# Patient Record
Sex: FEMALE | Race: WHITE | NOT HISPANIC OR LATINO | Employment: OTHER | URBAN - METROPOLITAN AREA
[De-identification: names, ages, dates, MRNs, and addresses within clinical notes are randomized per-mention and may not be internally consistent; named-entity substitution may affect disease eponyms.]

---

## 2018-05-08 ENCOUNTER — APPOINTMENT (OUTPATIENT)
Dept: RADIOLOGY | Facility: MEDICAL CENTER | Age: 78
End: 2018-05-08
Attending: EMERGENCY MEDICINE
Payer: MEDICARE

## 2018-05-08 ENCOUNTER — HOSPITAL ENCOUNTER (OUTPATIENT)
Facility: MEDICAL CENTER | Age: 78
End: 2018-05-10
Attending: EMERGENCY MEDICINE | Admitting: HOSPITALIST
Payer: MEDICARE

## 2018-05-08 DIAGNOSIS — R79.89 POSITIVE D DIMER: ICD-10-CM

## 2018-05-08 DIAGNOSIS — M79.89 LEG SWELLING: ICD-10-CM

## 2018-05-08 DIAGNOSIS — R06.02 SHORTNESS OF BREATH: ICD-10-CM

## 2018-05-08 PROCEDURE — 84484 ASSAY OF TROPONIN QUANT: CPT

## 2018-05-08 PROCEDURE — 85379 FIBRIN DEGRADATION QUANT: CPT

## 2018-05-08 PROCEDURE — 99285 EMERGENCY DEPT VISIT HI MDM: CPT

## 2018-05-08 PROCEDURE — 83880 ASSAY OF NATRIURETIC PEPTIDE: CPT

## 2018-05-08 PROCEDURE — 85025 COMPLETE CBC W/AUTO DIFF WBC: CPT

## 2018-05-08 PROCEDURE — 80053 COMPREHEN METABOLIC PANEL: CPT

## 2018-05-08 PROCEDURE — 36415 COLL VENOUS BLD VENIPUNCTURE: CPT

## 2018-05-08 RX ORDER — LISINOPRIL 5 MG/1
2.5 TABLET ORAL 2 TIMES DAILY
COMMUNITY

## 2018-05-08 RX ORDER — ASPIRIN 81 MG/1
81 TABLET, CHEWABLE ORAL DAILY
COMMUNITY

## 2018-05-08 RX ORDER — FUROSEMIDE 20 MG/1
20 TABLET ORAL DAILY
COMMUNITY

## 2018-05-08 ASSESSMENT — PAIN SCALES - GENERAL
PAINLEVEL_OUTOF10: 5
PAINLEVEL_OUTOF10: 8

## 2018-05-09 ENCOUNTER — APPOINTMENT (OUTPATIENT)
Dept: RADIOLOGY | Facility: MEDICAL CENTER | Age: 78
End: 2018-05-09
Attending: HOSPITALIST
Payer: MEDICARE

## 2018-05-09 ENCOUNTER — APPOINTMENT (OUTPATIENT)
Dept: RADIOLOGY | Facility: MEDICAL CENTER | Age: 78
End: 2018-05-09
Attending: EMERGENCY MEDICINE
Payer: MEDICARE

## 2018-05-09 PROBLEM — I50.9 CHF (CONGESTIVE HEART FAILURE) (HCC): Status: ACTIVE | Noted: 2018-05-09

## 2018-05-09 PROBLEM — R79.89 POSITIVE D DIMER: Status: ACTIVE | Noted: 2018-05-09

## 2018-05-09 PROBLEM — I10 HTN (HYPERTENSION): Status: ACTIVE | Noted: 2018-05-09

## 2018-05-09 LAB
ALBUMIN SERPL BCP-MCNC: 3.5 G/DL (ref 3.2–4.9)
ALBUMIN/GLOB SERPL: 1.2 G/DL
ALP SERPL-CCNC: 79 U/L (ref 30–99)
ALT SERPL-CCNC: 18 U/L (ref 2–50)
ANION GAP SERPL CALC-SCNC: 3 MMOL/L (ref 0–11.9)
APPEARANCE UR: ABNORMAL
AST SERPL-CCNC: 22 U/L (ref 12–45)
BACTERIA #/AREA URNS HPF: ABNORMAL /HPF
BASOPHILS # BLD AUTO: 0.5 % (ref 0–1.8)
BASOPHILS # BLD: 0.06 K/UL (ref 0–0.12)
BILIRUB SERPL-MCNC: 0.8 MG/DL (ref 0.1–1.5)
BILIRUB UR QL STRIP.AUTO: NEGATIVE
BNP SERPL-MCNC: 29 PG/ML (ref 0–100)
BUN SERPL-MCNC: 17 MG/DL (ref 8–22)
CALCIUM SERPL-MCNC: 8.3 MG/DL (ref 8.4–10.2)
CHLORIDE SERPL-SCNC: 108 MMOL/L (ref 96–112)
CO2 SERPL-SCNC: 25 MMOL/L (ref 20–33)
COLOR UR: YELLOW
CREAT SERPL-MCNC: 0.97 MG/DL (ref 0.5–1.4)
DEPRECATED D DIMER PPP IA-ACNC: 301 NG/ML(D-DU)
EOSINOPHIL # BLD AUTO: 0.59 K/UL (ref 0–0.51)
EOSINOPHIL NFR BLD: 5 % (ref 0–6.9)
EPI CELLS #/AREA URNS HPF: ABNORMAL /HPF
ERYTHROCYTE [DISTWIDTH] IN BLOOD BY AUTOMATED COUNT: 45.7 FL (ref 35.9–50)
GLOBULIN SER CALC-MCNC: 3 G/DL (ref 1.9–3.5)
GLUCOSE SERPL-MCNC: 99 MG/DL (ref 65–99)
GLUCOSE UR STRIP.AUTO-MCNC: NEGATIVE MG/DL
HCT VFR BLD AUTO: 43.2 % (ref 37–47)
HGB BLD-MCNC: 14.2 G/DL (ref 12–16)
IMM GRANULOCYTES # BLD AUTO: 0.04 K/UL (ref 0–0.11)
IMM GRANULOCYTES NFR BLD AUTO: 0.3 % (ref 0–0.9)
KETONES UR STRIP.AUTO-MCNC: ABNORMAL MG/DL
LEUKOCYTE ESTERASE UR QL STRIP.AUTO: ABNORMAL
LV EJECT FRACT  99904: 60
LV EJECT FRACT MOD 2C 99903: 68.58
LV EJECT FRACT MOD 4C 99902: 54.13
LV EJECT FRACT MOD BP 99901: 61.99
LYMPHOCYTES # BLD AUTO: 2.22 K/UL (ref 1–4.8)
LYMPHOCYTES NFR BLD: 19 % (ref 22–41)
MCH RBC QN AUTO: 32.2 PG (ref 27–33)
MCHC RBC AUTO-ENTMCNC: 32.9 G/DL (ref 33.6–35)
MCV RBC AUTO: 98 FL (ref 81.4–97.8)
MICRO URNS: ABNORMAL
MONOCYTES # BLD AUTO: 0.9 K/UL (ref 0–0.85)
MONOCYTES NFR BLD AUTO: 7.7 % (ref 0–13.4)
MUCOUS THREADS #/AREA URNS HPF: ABNORMAL /HPF
NEUTROPHILS # BLD AUTO: 7.88 K/UL (ref 2–7.15)
NEUTROPHILS NFR BLD: 67.5 % (ref 44–72)
NITRITE UR QL STRIP.AUTO: NEGATIVE
NRBC # BLD AUTO: 0 K/UL
NRBC BLD-RTO: 0 /100 WBC
PH UR STRIP.AUTO: 5 [PH]
PLATELET # BLD AUTO: 170 K/UL (ref 164–446)
PMV BLD AUTO: 12.3 FL (ref 9–12.9)
POTASSIUM SERPL-SCNC: 3.7 MMOL/L (ref 3.6–5.5)
PROT SERPL-MCNC: 6.5 G/DL (ref 6–8.2)
PROT UR QL STRIP: NEGATIVE MG/DL
RBC # BLD AUTO: 4.41 M/UL (ref 4.2–5.4)
RBC # URNS HPF: ABNORMAL /HPF
RBC UR QL AUTO: ABNORMAL
SODIUM SERPL-SCNC: 136 MMOL/L (ref 135–145)
SP GR UR REFRACTOMETRY: 1.03
TROPONIN I SERPL-MCNC: <0.02 NG/ML (ref 0–0.04)
TROPONIN I SERPL-MCNC: <0.02 NG/ML (ref 0–0.04)
WBC # BLD AUTO: 11.7 K/UL (ref 4.8–10.8)
WBC #/AREA URNS HPF: ABNORMAL /HPF

## 2018-05-09 PROCEDURE — 700102 HCHG RX REV CODE 250 W/ 637 OVERRIDE(OP): Performed by: HOSPITALIST

## 2018-05-09 PROCEDURE — 93306 TTE W/DOPPLER COMPLETE: CPT

## 2018-05-09 PROCEDURE — A9270 NON-COVERED ITEM OR SERVICE: HCPCS | Performed by: HOSPITALIST

## 2018-05-09 PROCEDURE — 93970 EXTREMITY STUDY: CPT

## 2018-05-09 PROCEDURE — G0378 HOSPITAL OBSERVATION PER HR: HCPCS

## 2018-05-09 PROCEDURE — 99219 PR INITIAL OBSERVATION CARE,LEVL II: CPT | Performed by: HOSPITALIST

## 2018-05-09 PROCEDURE — 81001 URINALYSIS AUTO W/SCOPE: CPT

## 2018-05-09 PROCEDURE — 71046 X-RAY EXAM CHEST 2 VIEWS: CPT

## 2018-05-09 PROCEDURE — 84484 ASSAY OF TROPONIN QUANT: CPT

## 2018-05-09 PROCEDURE — 93005 ELECTROCARDIOGRAM TRACING: CPT | Performed by: EMERGENCY MEDICINE

## 2018-05-09 PROCEDURE — 93306 TTE W/DOPPLER COMPLETE: CPT | Mod: 26 | Performed by: INTERNAL MEDICINE

## 2018-05-09 RX ORDER — POLYETHYLENE GLYCOL 3350 17 G/17G
1 POWDER, FOR SOLUTION ORAL
Status: DISCONTINUED | OUTPATIENT
Start: 2018-05-09 | End: 2018-05-10 | Stop reason: HOSPADM

## 2018-05-09 RX ORDER — ASPIRIN 81 MG/1
81 TABLET, CHEWABLE ORAL DAILY
Status: DISCONTINUED | OUTPATIENT
Start: 2018-05-09 | End: 2018-05-10 | Stop reason: HOSPADM

## 2018-05-09 RX ORDER — LISINOPRIL 2.5 MG/1
2.5 TABLET ORAL 2 TIMES DAILY
Status: DISCONTINUED | OUTPATIENT
Start: 2018-05-09 | End: 2018-05-10 | Stop reason: HOSPADM

## 2018-05-09 RX ORDER — AMOXICILLIN 250 MG
2 CAPSULE ORAL 2 TIMES DAILY
Status: DISCONTINUED | OUTPATIENT
Start: 2018-05-09 | End: 2018-05-10 | Stop reason: HOSPADM

## 2018-05-09 RX ORDER — ACETAMINOPHEN 325 MG/1
650 TABLET ORAL EVERY 6 HOURS PRN
Status: DISCONTINUED | OUTPATIENT
Start: 2018-05-09 | End: 2018-05-10 | Stop reason: HOSPADM

## 2018-05-09 RX ORDER — FUROSEMIDE 20 MG/1
20 TABLET ORAL 2 TIMES DAILY
Status: DISCONTINUED | OUTPATIENT
Start: 2018-05-09 | End: 2018-05-10 | Stop reason: HOSPADM

## 2018-05-09 RX ORDER — BISACODYL 10 MG
10 SUPPOSITORY, RECTAL RECTAL
Status: DISCONTINUED | OUTPATIENT
Start: 2018-05-09 | End: 2018-05-10 | Stop reason: HOSPADM

## 2018-05-09 RX ADMIN — ASPIRIN 81 MG 81 MG: 81 TABLET ORAL at 09:12

## 2018-05-09 RX ADMIN — LISINOPRIL 2.5 MG: 2.5 TABLET ORAL at 20:39

## 2018-05-09 RX ADMIN — FUROSEMIDE 20 MG: 20 TABLET ORAL at 06:28

## 2018-05-09 RX ADMIN — LISINOPRIL 2.5 MG: 2.5 TABLET ORAL at 09:12

## 2018-05-09 ASSESSMENT — PATIENT HEALTH QUESTIONNAIRE - PHQ9
SUM OF ALL RESPONSES TO PHQ9 QUESTIONS 1 AND 2: 0
SUM OF ALL RESPONSES TO PHQ9 QUESTIONS 1 AND 2: 0
1. LITTLE INTEREST OR PLEASURE IN DOING THINGS: NOT AT ALL
2. FEELING DOWN, DEPRESSED, IRRITABLE, OR HOPELESS: NOT AT ALL
1. LITTLE INTEREST OR PLEASURE IN DOING THINGS: NOT AT ALL
2. FEELING DOWN, DEPRESSED, IRRITABLE, OR HOPELESS: NOT AT ALL

## 2018-05-09 ASSESSMENT — ENCOUNTER SYMPTOMS
DEPRESSION: 0
COUGH: 0
HEADACHES: 0
PALPITATIONS: 0
ABDOMINAL PAIN: 0
FOCAL WEAKNESS: 0
NAUSEA: 0
DIZZINESS: 0
VOMITING: 0
PHOTOPHOBIA: 0
CHILLS: 0
WHEEZING: 0
SORE THROAT: 0
FEVER: 0
SHORTNESS OF BREATH: 1
TINGLING: 0
MYALGIAS: 0
DIARRHEA: 0

## 2018-05-09 ASSESSMENT — COPD QUESTIONNAIRES
DO YOU EVER COUGH UP ANY MUCUS OR PHLEGM?: NO/ONLY WITH OCCASIONAL COLDS OR INFECTIONS
DO YOU EVER COUGH UP ANY MUCUS OR PHLEGM?: NO/ONLY WITH OCCASIONAL COLDS OR INFECTIONS
COPD SCREENING SCORE: 4
IN THE PAST 12 MONTHS DO YOU DO LESS THAN YOU USED TO BECAUSE OF YOUR BREATHING PROBLEMS: DISAGREE/UNSURE
DURING THE PAST 4 WEEKS HOW MUCH DID YOU FEEL SHORT OF BREATH: NONE/LITTLE OF THE TIME
COPD SCREENING SCORE: 4
DURING THE PAST 4 WEEKS HOW MUCH DID YOU FEEL SHORT OF BREATH: NONE/LITTLE OF THE TIME
HAVE YOU SMOKED AT LEAST 100 CIGARETTES IN YOUR ENTIRE LIFE: YES
HAVE YOU SMOKED AT LEAST 100 CIGARETTES IN YOUR ENTIRE LIFE: YES

## 2018-05-09 ASSESSMENT — PAIN SCALES - GENERAL
PAINLEVEL_OUTOF10: 0

## 2018-05-09 ASSESSMENT — LIFESTYLE VARIABLES
EVER_SMOKED: YES
ALCOHOL_USE: NO
EVER_SMOKED: YES

## 2018-05-09 NOTE — ASSESSMENT & PLAN NOTE
This by the patient's report only, he not have any records to confirm a diagnosis of congestive heart failure. Her current BNP is normal. She has no clinical evidence of volume overload. I will continue her home Lasix and ACE inhibitor. If blood pressure tolerates, we may consider the addition of a beta blocker and also a baby aspirin.

## 2018-05-09 NOTE — ED NOTES
Report to Tyrel BENNETT.  Patient appears asleep in bed. Regular unlabored respirations visualized.

## 2018-05-09 NOTE — ED NOTES
Pt in bed, c/o maxilla and nose pain and right side rib pain from fall a month ago. Pt also c/o shortness of breath and bilateral leg swelling greater than normal.

## 2018-05-09 NOTE — PROGRESS NOTES
Call received from [employer]; IV infiltrated. RN to bedside to see pt and discuss IV placement. Pt refuses IV placement and understands importance of test and test results of possible blood clot in lungs cannot be obtained without completion of this test. Dr. Osman aware.   Pt assisted to ambulate hallway; room air saturation with ambulation is 97%; HR 80s-90s. Pt assisted back to bed safely to sit up and eat lunch. Will continue with care.

## 2018-05-09 NOTE — PROGRESS NOTES
Patient admitted after midnight by Dr. Farrell. This is a 77-year-old female who presents for evaluation of shortness of breath and leg swelling. Patient has a history of IV contrast allergy. VQ scan was ordered but patient refused as she did not a peripheral IV placed. Patient understood the risks of not having further evaluation of her shortness of breath and she understands the risks. She is alert awake oriented ×4. Ultrasound lower extremity negative for DVT. Echocardiogram ordered for further evaluation.

## 2018-05-09 NOTE — ASSESSMENT & PLAN NOTE
This is chronic, blood pressure currently well controlled on home lisinopril, continue and monitor.

## 2018-05-09 NOTE — ED NOTES
Pt transported to room 322 on [facility] by RN and with all pt belongings.

## 2018-05-09 NOTE — H&P
Hospital Medicine History and Physical    Date of Service  5/9/2018    Chief Complaint  Shortness of breath and leg swelling    History of Presenting Illness  77 y.o. female who presented on 5/8/2018 with shortness of breath and leg swelling which have been ongoing for the last day. The patient left [place] on March 29 for cross-country drive to [facility] for a family gathering. She has been traveling alone. She just arrived in [place] today and states that she began to have shortness of breath yesterday. Today, her breathing problems gradually worsened was also associated with reported lower extremity swelling. She states that while driving, she will sit in the car for 6 hours straight before stopping. She reports a history of congestive heart failure and has been taking her Lasix as prescribed. She is not on supplemental O2 at baseline. Upon assessment in the emergency room, patient is not hypoxic but is noted to have an elevated d-dimer level.      Primary Care Physician  Pcp Pt States None    Consultants  None    Code Status  Full    Review of Systems  Review of Systems   Constitutional: Positive for malaise/fatigue. Negative for chills and fever.   HENT: Negative for congestion and sore throat.    Eyes: Negative for photophobia.   Respiratory: Positive for shortness of breath. Negative for cough and wheezing.    Cardiovascular: Negative for chest pain and palpitations.   Gastrointestinal: Negative for abdominal pain, diarrhea, nausea and vomiting.   Genitourinary: Negative for dysuria.   Musculoskeletal: Negative for myalgias.        Leg pain   Skin: Negative.    Neurological: Negative for dizziness, tingling, focal weakness and headaches.   Psychiatric/Behavioral: Negative for depression and suicidal ideas.        Past Medical History  Past Medical History:   Diagnosis Date   • Congestive heart failure (HCC)    • Hypertension        Surgical History  Past Surgical History:   Procedure Laterality Date   •  PACEMAKER INSERTION         Medications  No current facility-administered medications on file prior to encounter.      No current outpatient prescriptions on file prior to encounter.       Family History  History reviewed. No pertinent family history.    Social History  Social History   Substance Use Topics   • Smoking status: Never Smoker   • Smokeless tobacco: Never Used   • Alcohol use No       Allergies  Allergies   Allergen Reactions   • Cillins [Penicillins]    • Codeine    • Iodine    • Sulfa Drugs         Physical Exam  Laboratory   Hemodynamics  Temp (24hrs), Av.9 °C (98.5 °F), Min:36.9 °C (98.5 °F), Max:36.9 °C (98.5 °F)   Temperature: 36.9 °C (98.5 °F)  Pulse  Av.1  Min: 60  Max: 86 Heart Rate (Monitored): 62  Blood Pressure : 123/74, NIBP: 122/66      Respiratory      Respiration: (!) 23, Pulse Oximetry: 100 %             Physical Exam   Constitutional: She is oriented to person, place, and time. No distress.   Obese   HENT:   Head: Normocephalic and atraumatic.   Right Ear: External ear normal.   Left Ear: External ear normal.   Eyes: EOM are normal. Right eye exhibits no discharge. Left eye exhibits no discharge.   Neck: Neck supple. No JVD present.   Cardiovascular: Normal rate, regular rhythm and normal heart sounds.    Pulmonary/Chest: Effort normal and breath sounds normal. No respiratory distress. She exhibits no tenderness.   Breath sounds distant secondary to habitus   Abdominal: Soft. Bowel sounds are normal. She exhibits no distension. There is no tenderness.   Musculoskeletal: Normal range of motion. She exhibits no edema (no pitting).   Neurological: She is alert and oriented to person, place, and time. No cranial nerve deficit.   Skin: Skin is warm and dry. She is not diaphoretic. No erythema.   Psychiatric: She has a normal mood and affect. Her behavior is normal.   Nursing note and vitals reviewed.    Capillary refill less than 3 seconds, distal pulses intact    Recent Labs       05/08/18   2345   WBC  11.7*   RBC  4.41   HEMOGLOBIN  14.2   HEMATOCRIT  43.2   MCV  98.0*   MCH  32.2   MCHC  32.9*   RDW  45.7   PLATELETCT  170   MPV  12.3     Recent Labs      05/08/18   2345   SODIUM  136   POTASSIUM  3.7   CHLORIDE  108   CO2  25   GLUCOSE  99   BUN  17   CREATININE  0.97   CALCIUM  8.3*     Recent Labs      05/08/18   2345   ALTSGPT  18   ASTSGOT  22   ALKPHOSPHAT  79   TBILIRUBIN  0.8   GLUCOSE  99         Recent Labs      05/08/18   2345   BNPBTYPENAT  29         Lab Results   Component Value Date    TROPONINI <0.02 05/08/2018       Imaging  Dx-chest-2 Views    Result Date: 5/9/2018 5/9/2018 12:59 AM HISTORY/REASON FOR EXAM:  Shortness of Breath TECHNIQUE/EXAM DESCRIPTION: PA and lateral views of the chest. COMPARISON:  None. FINDINGS: There is a left sided pacemaker in good position with its distal leads overlying the right atrium and right ventricle. There is a moderate-sized air-fluid level in the left retrocardiac region The lungs are clear. The cardiac silhouette is normal in size. No effusions or pneumothoraces are present. There are no significant osseous abnormalities. The visualized portions of the upper abdomen are within normal limits.     1.  There is a left sided pacemaker in good position with its distal leads overlying the right atrium and right ventricle. 2.  Moderate sized retrocardiac hiatal hernia.    Us-extremity Venous Bilateral Lower    Result Date: 5/9/2018 5/9/2018 12:20 AM HISTORY/REASON FOR EXAM:  Pain in Limb with Pressure TECHNIQUE/EXAM DESCRIPTION: Using both color and pulsed-wave Doppler imaging, multiple images were obtained of the bilateral lower extremities from the common femoral vein origins distally through the popliteal trifurcations.  Graded compression was used to demonstrate patent lumens. COMPARISON:  None. FINDINGS: There is no evidence of luminal thrombus.  There is normal augmentation to flow and respiratory variation. 3.2 cm elliptical fluid  signal intensity collection is noted posterior medial to the right knee. 2.8 cm elliptical collection is noted adjacent to the posterior medial aspect of the left knee.     1.  No evidence of bilateral lower extremity deep venous thrombosis. 2.  Small bilateral Baker cysts are identified.     Assessment/Plan     Anticipate that patient will need less than 2 midnights for management of the discussed medical issues.    * Positive D dimer   Assessment & Plan    Patient certainly has risk factors for pulmonary emboli in light of her long confinement in the car. Ultrasound of the lower extremities fortunately shows no DVT which is encouraging. She is short of breath but is not hypoxic. Patient reports a history of IV contrast allergies therefore she will be admitted for a VQ scan. Respiratory therapy protocol if needed.        CHF (congestive heart failure) (HCC)   Assessment & Plan    This by the patient's report only, I do not have any records to confirm a diagnosis of congestive heart failure. Her current BNP is normal. She has no clinical evidence of volume overload. I will continue her home Lasix and ACE inhibitor. If blood pressure tolerates, we may consider the addition of a beta blocker and also a baby aspirin.        HTN (hypertension)   Assessment & Plan    This is chronic, blood pressure currently well controlled on home lisinopril, continue and monitor.          Prophylaxis: Sequential compression devices for DVT prophylaxis, no PPI indicated, bowel protocol

## 2018-05-09 NOTE — PROGRESS NOTES
Report received from SABRINA Sarah. Pt resting comfortably in bed, sleeping, in no apparent distress. Will continue with care.

## 2018-05-09 NOTE — PROGRESS NOTES
Report received from Mira about 1530 and pt transferred in a wheelchair about 1545.  Echo at bedside about 1610.  Skin assessment done after she finished and pt has 3 spot of dermatitis on her left buttock.  VSS.  Pt steady on her feet.  Trace about of swelling in her ankles.  She says that the sob is gone.

## 2018-05-09 NOTE — ASSESSMENT & PLAN NOTE
He should certainly has risk factors for pulmonary emboli in light of her long confinement in the car. Ultrasound of the lower extremities fortunately shows no DVT which is encouraging. She is short of breath but is not hypoxic. Patient reports a history of IV contrast allergies therefore she will be admitted for a VQ scan.

## 2018-05-09 NOTE — ED NOTES
Pt states she is allergic to iodine contrast, she passed out and vomited after she had CT with contrast years ago. ERP notified. IV placed by other RN using ultrasound. Blood samples to lab.

## 2018-05-10 ENCOUNTER — PATIENT OUTREACH (OUTPATIENT)
Dept: HEALTH INFORMATION MANAGEMENT | Facility: OTHER | Age: 78
End: 2018-05-10

## 2018-05-10 VITALS
SYSTOLIC BLOOD PRESSURE: 121 MMHG | HEART RATE: 63 BPM | DIASTOLIC BLOOD PRESSURE: 49 MMHG | RESPIRATION RATE: 18 BRPM | HEIGHT: 62 IN | TEMPERATURE: 98.1 F | BODY MASS INDEX: 36.51 KG/M2 | WEIGHT: 198.41 LBS | OXYGEN SATURATION: 95 %

## 2018-05-10 PROBLEM — R79.89 POSITIVE D DIMER: Status: RESOLVED | Noted: 2018-05-09 | Resolved: 2018-05-10

## 2018-05-10 LAB
ANION GAP SERPL CALC-SCNC: 3 MMOL/L (ref 0–11.9)
BUN SERPL-MCNC: 17 MG/DL (ref 8–22)
CALCIUM SERPL-MCNC: 8.8 MG/DL (ref 8.4–10.2)
CHLORIDE SERPL-SCNC: 108 MMOL/L (ref 96–112)
CO2 SERPL-SCNC: 27 MMOL/L (ref 20–33)
CREAT SERPL-MCNC: 0.81 MG/DL (ref 0.5–1.4)
ERYTHROCYTE [DISTWIDTH] IN BLOOD BY AUTOMATED COUNT: 46.1 FL (ref 35.9–50)
GLUCOSE SERPL-MCNC: 91 MG/DL (ref 65–99)
HCT VFR BLD AUTO: 42.2 % (ref 37–47)
HGB BLD-MCNC: 13.8 G/DL (ref 12–16)
MCH RBC QN AUTO: 31.9 PG (ref 27–33)
MCHC RBC AUTO-ENTMCNC: 32.7 G/DL (ref 33.6–35)
MCV RBC AUTO: 97.5 FL (ref 81.4–97.8)
PLATELET # BLD AUTO: 157 K/UL (ref 164–446)
PMV BLD AUTO: 12.9 FL (ref 9–12.9)
POTASSIUM SERPL-SCNC: 3.8 MMOL/L (ref 3.6–5.5)
RBC # BLD AUTO: 4.33 M/UL (ref 4.2–5.4)
SODIUM SERPL-SCNC: 138 MMOL/L (ref 135–145)
WBC # BLD AUTO: 7.5 K/UL (ref 4.8–10.8)

## 2018-05-10 PROCEDURE — 85027 COMPLETE CBC AUTOMATED: CPT

## 2018-05-10 PROCEDURE — G0378 HOSPITAL OBSERVATION PER HR: HCPCS

## 2018-05-10 PROCEDURE — A9270 NON-COVERED ITEM OR SERVICE: HCPCS | Performed by: HOSPITALIST

## 2018-05-10 PROCEDURE — 99217 PR OBSERVATION CARE DISCHARGE: CPT | Performed by: INTERNAL MEDICINE

## 2018-05-10 PROCEDURE — 700102 HCHG RX REV CODE 250 W/ 637 OVERRIDE(OP): Performed by: HOSPITALIST

## 2018-05-10 PROCEDURE — 80048 BASIC METABOLIC PNL TOTAL CA: CPT

## 2018-05-10 RX ADMIN — ASPIRIN 81 MG 81 MG: 81 TABLET ORAL at 08:19

## 2018-05-10 ASSESSMENT — PAIN SCALES - GENERAL: PAINLEVEL_OUTOF10: 0

## 2018-05-10 NOTE — PROGRESS NOTES
"0600 Pt up to bathroom, back to bed. Pt /65. Pt refusing Lasix. Stating her \"BP is bottoming out\" and that she \"wants to talk to the dr.\" This RN explained the importance of taking her Lasix, pt still refusing. Pt resting comfortably in bed, no other needs at this time.  "

## 2018-05-10 NOTE — PROGRESS NOTES
"Dr. Osman rounded about 1040 and wrote orders for discharge.  When going to ask about 1045 what time she wanted to leave, she states \"I'm not leaving until I get my nose looked at and check for a broken bone in back because the paramedics said I broke my back on April 20th when I fell in the bathroom in the [facility] in [place].\" Dr. Osman back to the room and pulled up her cxr and showed her the images and spoke to her that she had been seen in [place] for the fall.  Pt requested that she be able to eat her lunch and then will take her home medication of lisinopril as she has one in her purse.  After eating lunch, pt wanted to wait an hour before leaving as she will be driving.  Pt showered and changed about 1330.  Discharging Patient home per physician order.  Discharged with herself at 1445.  Demonstrated understanding of discharge instructions, follow up appointments, home medications, prescriptions, and nursing care instructions for heart failure.  Ambulating without assistance, voiding without difficulty, pain well controlled, tolerating oral medications, oxygen saturation greater than 90% , tolerating diet.   Educational handouts given and discussed.  Verbalized understanding of discharge instructions and educational handouts.  All questions answered.  Belongings with patient at time of discharge.  "

## 2018-05-10 NOTE — PROGRESS NOTES
0655 Bedside report given to day shift RNChirag. POC discussed. Pt resting comfortably in bed. Call light and personal belongings within reach. Hourly rounding and safety precautions in place.

## 2018-05-10 NOTE — DISCHARGE INSTRUCTIONS
Discharge Instructions    Discharged to home by car with self. Discharged via wheelchair, hospital escort: Yes.  Special equipment needed: Not Applicable    Be sure to schedule a follow-up appointment with your primary care doctor or any specialists as instructed.     Discharge Plan:   Diet Plan: Discussed  Activity Level: Discussed  Confirmed Follow up Appointment: Patient to Call and Schedule Appointment  Confirmed Symptoms Management: Discussed  Medication Reconciliation Updated: Yes  Pneumococcal Vaccine Administered/Refused: Not given - Patient refused pneumococcal vaccine  Influenza Vaccine Indication: Patient Refuses    I understand that a diet low in cholesterol, fat, and sodium is recommended for good health. Unless I have been given specific instructions below for another diet, I accept this instruction as my diet prescription.       Special Instructions:     Heart Failure  Heart failure means your heart has trouble pumping blood. This makes it hard for your body to work well. Heart failure is usually a long-term (chronic) condition. You must take good care of yourself and follow your doctor's treatment plan.  HOME CARE  · Take your heart medicine as told by your doctor.  ¨ Do not stop taking medicine unless your doctor tells you to.  ¨ Do not skip any dose of medicine.  ¨ Refill your medicines before they run out.  ¨ Take other medicines only as told by your doctor or pharmacist.  · Stay active if told by your doctor. The elderly and people with severe heart failure should talk with a doctor about physical activity.  · Eat heart-healthy foods. Choose foods that are without trans fat and are low in saturated fat, cholesterol, and salt (sodium). This includes fresh or frozen fruits and vegetables, fish, lean meats, fat-free or low-fat dairy foods, whole grains, and high-fiber foods. Lentils and dried peas and beans (legumes) are also good choices.  · Limit salt if told by your doctor.  · Cook in a healthy  way. Roast, grill, broil, bake, poach, steam, or stir-pan foods.  · Limit fluids as told by your doctor.  · Weigh yourself every morning. Do this after you pee (urinate) and before you eat breakfast. Write down your weight to give to your doctor.  · Take your blood pressure and write it down if your doctor tells you to.  · Ask your doctor how to check your pulse. Check your pulse as told.  · Lose weight if told by your doctor.  · Stop smoking or chewing tobacco. Do not use gum or patches that help you quit without your doctor's approval.  · Schedule and go to doctor visits as told.  · Nonpregnant women should have no more than 1 drink a day. Men should have no more than 2 drinks a day. Talk to your doctor about drinking alcohol.  · Stop illegal drug use.  · Stay current with shots (immunizations).  · Manage your health conditions as told by your doctor.  · Learn to manage your stress.  · Rest when you are tired.  · If it is really hot outside:  ¨ Avoid intense activities.  ¨ Use air conditioning or fans, or get in a cooler place.  ¨ Avoid caffeine and alcohol.  ¨ Wear loose-fitting, lightweight, and light-colored clothing.  · If it is really cold outside:  ¨ Avoid intense activities.  ¨ Layer your clothing.  ¨ Wear mittens or gloves, a hat, and a scarf when going outside.  ¨ Avoid alcohol.  · Learn about heart failure and get support as needed.  · Get help to maintain or improve your quality of life and your ability to care for yourself as needed.  GET HELP IF:   · You gain weight quickly.  · You are more short of breath than usual.  · You cannot do your normal activities.  · You tire easily.  · You cough more than normal, especially with activity.  · You have any or more puffiness (swelling) in areas such as your hands, feet, ankles, or belly (abdomen).  · You cannot sleep because it is hard to breathe.  · You feel like your heart is beating fast (palpitations).  · You get dizzy or light-headed when you stand  up.  GET HELP RIGHT AWAY IF:   · You have trouble breathing.  · There is a change in mental status, such as becoming less alert or not being able to focus.  · You have chest pain or discomfort.  · You faint.  MAKE SURE YOU:   · Understand these instructions.  · Will watch your condition.  · Will get help right away if you are not doing well or get worse.  This information is not intended to replace advice given to you by your health care provider. Make sure you discuss any questions you have with your health care provider.  Document Released: 09/26/2009 Document Revised: 01/08/2016 Document Reviewed: 02/03/2014  [employer] Interactive Patient Education © 2017 [employer]      · Is patient discharged on Warfarin / Coumadin?   No     Depression / Suicide Risk    As you are discharged from this [facility] facility, it is important to learn how to keep safe from harming yourself.    Recognize the warning signs:  · Abrupt changes in personality, positive or negative- including increase in energy   · Giving away possessions  · Change in eating patterns- significant weight changes-  positive or negative  · Change in sleeping patterns- unable to sleep or sleeping all the time   · Unwillingness or inability to communicate  · Depression  · Unusual sadness, discouragement and loneliness  · Talk of wanting to die  · Neglect of personal appearance   · Rebelliousness- reckless behavior  · Withdrawal from people/activities they love  · Confusion- inability to concentrate     If you or a loved one observes any of these behaviors or has concerns about self-harm, here's what you can do:  · Talk about it- your feelings and reasons for harming yourself  · Remove any means that you might use to hurt yourself (examples: pills, rope, extension cords, firearm)  · Get professional help from the community (Mental Health, Substance Abuse, psychological counseling)  · Do not be alone:Call your Safe Contact- someone whom you trust who will  be there for you.  · Call your local CRISIS HOTLINE 673-9552 or 514-853-0093  · Call your local Children's Mobile Crisis Response Team [place] (099) 784-2689 or www.[employer]  · Call the toll free National Suicide Prevention Hotlines   · National Suicide Prevention Lifeline 367-997-NIBG (1179)  · National Trial[employer] Line Network 800-SUICIDE (387-9922)

## 2018-05-10 NOTE — PROGRESS NOTES
1900 Bedside report received from day shift RNChirag. POC discussed. Pt resting comfortably in bed. Call light and personal belongings within reach. Hourly rounding and safety precautions in place.

## 2018-05-10 NOTE — DISCHARGE SUMMARY
CHIEF COMPLAINT ON ADMISSION  Chief Complaint   Patient presents with   • Facial Pain   • Rib Pain       CODE STATUS  Full Code    HPI & HOSPITAL COURSE  Please see H&P dictated by Dr. Farrell. This is a 77 y.o. female here for evaluation of shortness of breath and leg swelling. Patient was admitted and monitored on telemetry. Patient has an allergy to IV contrast and therefore could not receive a CT chest for further evaluation to rule out pulmonary embolism. A VQ scan was ordered but patient refused study. Patient was alert awake oriented ×4 and risks and benefits of the study were discussed with her and she understood the risks and continued to refuse the VQ scan for further evaluation and rule out pulmonary embolism. Patient's ultrasound lower extremity was negative for DVT. Patient had echocardiogram which showed ejection fraction of 60%, normal diastolic function, RVSP of 33 mmHg. Patient was saturating well on room air at the time of discharge. Patient states that she will follow-up with her PCP in [place].    The patient recovered much more quickly than anticipated on admission.    Therefore, she is discharged in good and stable condition with close outpatient follow-up.      DISCHARGE PROBLEM LIST  Principal Problem (Resolved):    Positive D dimer POA: Unknown  Active Problems:    HTN (hypertension) POA: Unknown    CHF (congestive heart failure) (HCC) POA: Unknown      FOLLOW UP    [facility], Emergency Dept  [address] [address] [phone]    If symptoms worsen, As needed      MEDICATIONS ON DISCHARGE   Jo Bone   Home Medication Instructions SACHIN:[id number]    Printed on:05/10/18 1110   Medication Information                      aspirin (ASA) 81 MG Chew Tab chewable tablet  Take 81 mg by mouth every day.             furosemide (LASIX) 20 MG Tab  Take 20 mg by mouth every day.             lisinopril (PRINIVIL) 5 MG Tab  Take 2.5 mg by mouth 2 times a day.                  DIET  Orders Placed This Encounter   Procedures   • DIET ORDER     Standing Status:   Standing     Number of Occurrences:   1     Order Specific Question:   Diet:     Answer:   Cardiac [6]       ACTIVITY  As tolerated.        CONSULTATIONS  None    PROCEDURES  None    LABORATORY  Lab Results   Component Value Date/Time    SODIUM 138 05/10/2018 04:10 AM    POTASSIUM 3.8 05/10/2018 04:10 AM    CHLORIDE 108 05/10/2018 04:10 AM    CO2 27 05/10/2018 04:10 AM    GLUCOSE 91 05/10/2018 04:10 AM    BUN 17 05/10/2018 04:10 AM    CREATININE 0.81 05/10/2018 04:10 AM        Lab Results   Component Value Date/Time    WBC 7.5 05/10/2018 04:10 AM    HEMOGLOBIN 13.8 05/10/2018 04:10 AM    HEMATOCRIT 42.2 05/10/2018 04:10 AM    PLATELETCT 157 (L) 05/10/2018 04:10 AM      This dictation was created using voice recognition software. The accuracy of the dictation is limited to the abilities of the software. I expect there may be some errors of grammar and possibly content.    Total time of the discharge process exceeds 38 minutes

## 2018-05-10 NOTE — CARE PLAN
Problem: Safety  Goal: Will remain free from falls  Outcome: PROGRESSING AS EXPECTED  Fall precautions in place. Barbara Avery fall risk assessment completed. Bed in lowest position w/ alarm on. Treaded slipper socks on pt. Call light and personal belongings within reach. Pt educated to call when in need of assistance. Pt SBA, steady gait noted.    Problem: Knowledge Deficit  Goal: Knowledge of disease process/condition, treatment plan, diagnostic tests, and medications will improve  Outcome: PROGRESSING AS EXPECTED  Pt updated on POC. Poss d/c tomorrow. Medications reviewed. All questions answered. Pt verbalized understanding.

## 2018-05-10 NOTE — PROGRESS NOTES
"Report received from raffi Mcduffie up to the bathroom at the time.  Morning assessment done about 0820.  Pt denies sob, but still has 1+ edema to her LE.  When going to do her medications, pt refused to take any medications unless they are [employer].  Explained to her that this is what we have stocked and she is requesting that we call the [employer] [facility] in [place] and order her medications.  The lasix tablet is manufactured by [employer] but pt refused to take it because \"I was up all night peeing and my doctor told me to take it every other day.\" when asking about placing a new IV pt still refusing.  Pt also asking when is she going home and if we are going to call her cardiologist in [place].    "

## 2018-05-10 NOTE — PROGRESS NOTES
2035 Due medications given per MAR. No IV access, pt refusing. MD aware per day shift RN. Assessment complete, see flowsheet. VSS. Pt A&Ox4. Pt denies any pain, N/V, or SOB. BLE edema noted 1+. Contact dermatitis noted on L buttock (red but, blanchable). Pt at edge of bed, POC discussed. Safety precautions in place, no other needs at this time.

## 2018-05-28 LAB — EKG IMPRESSION: NORMAL
